# Patient Record
Sex: FEMALE | Race: WHITE | NOT HISPANIC OR LATINO | Employment: FULL TIME | URBAN - METROPOLITAN AREA
[De-identification: names, ages, dates, MRNs, and addresses within clinical notes are randomized per-mention and may not be internally consistent; named-entity substitution may affect disease eponyms.]

---

## 2017-02-07 ENCOUNTER — TELEPHONE (OUTPATIENT)
Dept: UROGYNECOLOGY | Facility: CLINIC | Age: 57
End: 2017-02-07

## 2017-02-07 DIAGNOSIS — R39.15 URINARY URGENCY: ICD-10-CM

## 2017-02-07 DIAGNOSIS — N39.41 URGE INCONTINENCE: Primary | ICD-10-CM

## 2017-02-07 RX ORDER — OXYBUTYNIN CHLORIDE 5 MG/1
5 TABLET, EXTENDED RELEASE ORAL DAILY
Qty: 90 TABLET | Refills: 4 | Status: SHIPPED | OUTPATIENT
Start: 2017-02-07 | End: 2017-03-09

## 2017-02-07 NOTE — TELEPHONE ENCOUNTER
Pt requesting a 90 day supply of rx Oxybutynin 5 mg TR24 to be sent to [facility] in [place] [phone]. Pt stated the insurance denied the 30 day supply and informed her that the 90 day supply will be approved. Please advise.

## 2017-02-07 NOTE — TELEPHONE ENCOUNTER
----- Message from Tracy Green sent at 2/7/2017  8:55 AM CST -----  Contact: Patient  X_ 1st Request  _ 2nd Request  _ 3rd Request    Who: LORELEI GASTELUM [[id number]]    Why: Patient is calling in regards to needing a 90 day script for RX (oxybutynin (DITROPAN-XL) 5 MG TR24) called in to her pharmacy on file. Patient is needing a call back from staff to confirm.    What Number to Call Back: Patient can be reached at [phone].    When to Expect a call back: (Before the end of the day)  -- if call after 3:00 call back will be tomorrow.

## 2017-02-20 ENCOUNTER — OFFICE VISIT (OUTPATIENT)
Dept: UROGYNECOLOGY | Facility: CLINIC | Age: 57
End: 2017-02-20
Attending: OBSTETRICS & GYNECOLOGY
Payer: COMMERCIAL

## 2017-02-20 VITALS
DIASTOLIC BLOOD PRESSURE: 80 MMHG | SYSTOLIC BLOOD PRESSURE: 120 MMHG | WEIGHT: 132.5 LBS | HEIGHT: 61 IN | BODY MASS INDEX: 25.02 KG/M2

## 2017-02-20 DIAGNOSIS — R39.15 URINARY URGENCY: ICD-10-CM

## 2017-02-20 DIAGNOSIS — N39.41 URGE INCONTINENCE: Primary | ICD-10-CM

## 2017-02-20 PROCEDURE — 99213 OFFICE O/P EST LOW 20 MIN: CPT | Mod: S$GLB,,, | Performed by: OBSTETRICS & GYNECOLOGY

## 2017-02-20 PROCEDURE — 99999 PR PBB SHADOW E&M-EST. PATIENT-LVL II: CPT | Mod: PBBFAC,,, | Performed by: OBSTETRICS & GYNECOLOGY

## 2017-02-20 NOTE — PROGRESS NOTES
Subjective:       Patient ID: Stacie Nguyen is a 56 y.o. female.    Chief Complaint:  Follow-up      History of Present Illness: 55 Y O F P3 with history vaginal hysterectomy with anterior and posterior colporrhaphy and placement of transobturator sling in 11/2014 by Dr. Mejía for menorrhagia secondary to fibroids , cystocele, rectocele and stress incontinence.  Her post op course was complicated by difficulty emptying. Per patient she was under the care of Dr. Mejía through June of 2015. At this point she was told that she would need to have another anterior repair.  She was referred to see a urologist by her friend who is an NP. She began seeing Dr. Vazquez in June of 2015. He noted that she had voiding difficulty  and on 8/3/2015 took her to OR for a complete urethrolysis, removal of transobturator sling, placement of retropubic mid urethral sling and anterior colporrhaphy by Dr. Arreguin ( Ob/Gyn). She had a period of time where she needed to CIC due to persistently elevated post void residuals. It has been about 6 months since she has needed to self cath.      Interval  HP since the last visit   1)  UI:  (--) DAYNA  (+) UUI   (--) pads: 1-2 at most.  Daytime frequency: Q 4 -5 hours.  Nocturia: No:    (-) dysuria-  (--) hematuria,  (--) frequent UTIs.  (+) complete bladder emptying.     2)  POP:  Symptoms:(--)  .  (--) vaginal bleeding. (--) vaginal discharge. (+) sexually active.  (--) dyspareunia.   (--)  Vaginal dryness.  (+) vaginal estrogen use.     3)  BM:  (+) constipation/straining.  (--) chronic diarrhea. (--) hematochezia.  (--) fecal incontinence.  (--) fecal smearing/urgency.  (--) incomplete evacuation.  History of irritable bowel.      Gynecologic Exam   The patient's pertinent negatives include no pelvic pain or vaginal discharge. Associated symptoms include constipation, frequency and urgency. Pertinent negatives include no abdominal pain, back pain, chills, diarrhea, dysuria, fever, flank pain,  "hematuria, nausea, rash or vomiting.        [facility]rogyn [facility]      Question    8/4/2016  3:08 PM CDT - Filled by Koby Garcia, DO    General Urogynecology:  Are you experiencing the following?       Dysuria (painful urination)  No     Nocturia:  waking up at night to empty your bladder   Yes     If you answered yes to the previous question, how many times does this happen per night?  1-2     Enuresis (urine loss during sleep)  No     Dribbling urine after you urinate  No     Hematuria (urine appears red)  Yes     Type of stream  Strong    Urinary Incontinence (General):  Are you experiencing the following?       Past consultation for incontinence: Have you ever seen someone for the evaluation of incontinence?  Yes     If you answered yes to the previous question, please select all the therapies you have tried.   Other     Please note the effectiveness of the therapies.  Ineffective     Need to wear protection to keep clothes dry   No     If you answered yes to the previous question, please danielle the protection you use.   N/a- I answered no to the previous question     If you wear protection, how much wetness is typically on each pad?  N/a- I do not wear protection     If you wear protection, how often do you have to change per day, if applicable?   0    Stress Symptoms:  Are you experiencing the following?       Leakage of urine with cough, laugh and/or sneeze  No     If you answered yes to the previous question, what is the frequency in days, weeks and/or months?  Never     Leakage of urine with sex  No     Leakage of urine with bending/ lifting  No     Leakage of urine with briskly walking or jogging  No     If you lose urine for any other reason not previously mentioned, please note it below, if applicable.       Urge Symptoms:  Are you experiencing the following?       Urgency ("got to go" feeling)  Yes     Urge: How frequently do you feel an urge to urinate (feeling like you "gotta go" to the bathroom " and can't wait)  Daily     Do you experience a leakage of urine when you have a feeling of urgency?   Yes     Leakage of urine when unaware  No     Past use of anticholinergics (medications used to treat overactive bladder)  No     If you answered yes to the previous question, please danielle the anticholinergics you have used:        Have you ever used Mirbetriq (aka Mirabegron)?   No    Prolapse Symptoms:  Are you experiencing any of the following?        Falling out/ Bulging/ Heaviness in the vagina  No     Vaginal/ Abdominal Pain/ Pressure  No     Need to strain/ Push to void  No     Need to wait on the toilet before you void  No     Unusual position to urinate (using your hands to push back the vaginal bulge)  No     Sensation of incomplete emptying  No     Past use of pessary device  No     If you answered yes to the previous question, please list the devices you have used below.       Bowel Symptoms:  Are you experiencing any of the following?       Constipation  Yes     Diarrhea   No     Hematochezia (bloody stool)  No     Incomplete evacuation of stool  No     Involuntary loss of formed stool  No     Fecal smearing/urgency  No     Involuntary loss of gas  No    Vaginal Symptoms:  Are you experiencing any of the following?        Abnormal vaginal bleeding   No     Vaginal dryness  Yes     Sexually active   Yes     Dyspareunia (painful intercourse)  Yes, mostly on initial penetration      Estrogen use   Yes, recently started placed on the inside         HPI reviewed and confirmed with patient     No past medical history on file.    Past Surgical History   Procedure Laterality Date    Cystocele repair      Hysterectomy       Review of patient's allergies indicates:  No Known Allergies    Current Outpatient Prescriptions:     conjugated estrogens (PREMARIN) vaginal cream, 1 g with applicator or dime-sized amt with finger in vagina nightly x 2 weeks, then twice a week thereafter, Disp: 42.5 g, Rfl: 12    dextrin  (FIBER, DEXTRIN,) 3 gram/3.5 gram Powd, Take by mouth., Disp: , Rfl:     diclofenac sodium 1 % Gel, APPLY 2 GRAM TO THE AFFECTED AREA(S) BY TOPICAL ROUTE 4 TIMES PER DAY, Disp: , Rfl: 2    docusate sodium (COLACE) 50 MG capsule, Take by mouth 2 (two) times daily., Disp: , Rfl:     loteprednol (LOTEMAX) 0.5 % ophthalmic suspension, 1 drop 4 (four) times daily., Disp: , Rfl:     meloxicam (MOBIC) 15 MG tablet, Take 15 mg by mouth once daily., Disp: , Rfl:     oxybutynin (DITROPAN-XL) 5 MG TR24, Take 1 tablet (5 mg total) by mouth once daily., Disp: 90 tablet, Rfl: 4    XIIDRA 5 % Dpet, PLACE 1 DROP INTO BOTH EYES, Disp: , Rfl: 12      GYN & OB History  No LMP recorded. Patient has had a hysterectomy.   Date of Last Pap: No result found    OB History    Para Term  AB SAB TAB Ectopic Multiple Living   3               # Outcome Date GA Lbr Rolando/2nd Weight Sex Delivery Anes PTL Lv   3             2             1                    Review of Systems  Review of Systems   Constitutional: Negative.  Negative for activity change, appetite change, chills, diaphoresis, fatigue, fever and unexpected weight change.   HENT: Negative.    Eyes: Negative.    Respiratory: Negative.  Negative for apnea, cough and wheezing.    Cardiovascular: Negative.  Negative for chest pain and palpitations.   Gastrointestinal: Positive for constipation. Negative for abdominal distention, abdominal pain, anal bleeding, blood in stool, diarrhea, nausea, rectal pain and vomiting.   Endocrine: Negative.    Genitourinary: Positive for frequency and urgency. Negative for decreased urine volume, difficulty urinating, dyspareunia, dysuria, enuresis, flank pain, genital sores, hematuria, menstrual problem, pelvic pain, vaginal bleeding, vaginal discharge and vaginal pain.        Vaginal dryness    Musculoskeletal: Negative for back pain and gait problem.   Skin: Negative for color change, pallor, rash and wound.    Allergic/Immunologic: Negative for immunocompromised state.   Neurological: Negative.  Negative for dizziness and speech difficulty.   Hematological: Negative for adenopathy.   Psychiatric/Behavioral: Negative for agitation, behavioral problems, confusion and sleep disturbance.           Objective:     Physical Exam   Constitutional: She is oriented to person, place, and time. She appears well-developed.   HENT:   Head: Normocephalic and atraumatic.   Eyes: EOM are normal. Pupils are equal, round, and reactive to light.   Neck: Normal range of motion. Neck supple.   Cardiovascular: Normal rate, regular rhythm, S1 normal, S2 normal, normal heart sounds and intact distal pulses.  Exam reveals no gallop.    No murmur heard.  Pulmonary/Chest: Effort normal and breath sounds normal. No apnea, no tachypnea and no bradypnea. No respiratory distress. She has no decreased breath sounds. She has no wheezes. She has no rhonchi. She has no rales. She exhibits no tenderness.   Abdominal: Soft. Bowel sounds are normal. She exhibits no distension and no mass. There is no hepatosplenomegaly, splenomegaly or hepatomegaly. There is no tenderness. There is no rigidity, no rebound, no guarding and no CVA tenderness. No hernia. Hernia confirmed negative in the right inguinal area and confirmed negative in the left inguinal area.   Genitourinary: Pelvic exam was performed with patient supine. Rectum normal, vagina normal, skenes normal and bartholins normal. Right labia normal and left labia normal. Urethra exhibits no urethral caruncle, no urethral diverticulum, no urethral mass and no hypermobility. Right bartholin is not enlarged and not tender. Left bartholin is not enlarged and not tender. Rectal exam shows resting tone normal and active tone normal. Rectal exam shows no external hemorrhoid, no fissure, no tenderness, anal tone normal and no dovetailing. Guaiac negative stool. There is a rectocele, a cystocele, unspecified  prolapse of vaginal walls and atrophy in the vagina. No foreign body, tenderness, bleeding, fistula, mesh exposure or lavator tenderness in the vagina. No vaginal discharge found. Right adnexum displays no tenderness. Left adnexum displays no tenderness. Cervix exhibits absence. Uterus is absent.   PVR: 75 ML  Empty cough stress test: Negative.  Kegel: 4/5    POP-Q  Aa: -1 Ba: -1 C: -3   GH: 3 PB: 2 TVL: 7   Ap: 0 Bp: 0 D:                      Musculoskeletal: Normal range of motion.   Lymphadenopathy:     She has no axillary adenopathy.        Right: No inguinal adenopathy present.        Left: No inguinal adenopathy present.   Neurological: She is alert and oriented to person, place, and time. She has normal strength and normal reflexes. Cranial nerves II through XII intact. No cranial nerve deficit.   Skin: Skin is warm, dry and intact.   Psychiatric: She has a normal mood and affect. Her speech is normal and behavior is normal. Judgment normal. Cognition and memory are normal.          Assessment:        1. Urge incontinence    2. Urinary urgency               Plan:      1. Vaginal atrophy (dryness):  Continue  1 gram of estrogen cream in vagina  twice a week thereafter      2.  Urge urinary incontinence/ urinary urgency   --  Bladder diary for 3-7 days, write the number of times you go to the rest room and associated symptoms of urgency or leakage.   --Empty bladder every 3 hours.  Empty well: wait a minute, lean forward on toilet.    --Avoid dietary irritants (see sheet).  Keep diary x 3-5 days to determine your irritants.  --KEGELS: do 10 in AM and 10 in PM, holding each x 10 seconds.  When you feel urge to go, STOP, KEGEL, and when urge has passed, then go to bathroom.  Consider PT in future.    --URGE: Patient has a history of dry eye's we ordered Myrbetriq which was denied by her insurance company.  We started Ditropan 5 mg daily which has been working well will continue.   Takes 2-4 weeks to see if will  have effect.  For dry mouth: get sour, sugar free lozenge or gum. Recommend a cysto to evaluate the bladder.     3. Prolapse: Stage 1 apical prolapse, Stage  2 posterior vaginal wall prolapse: not new changes in prolapse    --Surgical options discussed not very bothersome for patient now    4. Recurrent UTI: no recent uti  --UA negative   --Vaginal estrogen has been shown to decrease the recurrence of urinary tract infections in post menopausal women, continue vaginal estrogen    Approximately 20 min were spent in consult, 90 % in discussion.     Koby Garcia DO  Female Pelvic Medicine and Reconstructive Surgery  [facility] [place], [place]

## 2017-06-12 ENCOUNTER — TELEPHONE (OUTPATIENT)
Dept: UROGYNECOLOGY | Facility: CLINIC | Age: 57
End: 2017-06-12

## 2017-06-12 DIAGNOSIS — R35.0 URINARY FREQUENCY: Primary | ICD-10-CM

## 2017-06-12 RX ORDER — CIPROFLOXACIN 500 MG/1
500 TABLET ORAL 2 TIMES DAILY
Qty: 10 TABLET | Refills: 0 | Status: SHIPPED | OUTPATIENT
Start: 2017-06-12 | End: 2017-06-17

## 2017-06-12 NOTE — TELEPHONE ENCOUNTER
----- Message from Tracy Green sent at 6/12/2017  9:18 AM CDT -----  Contact: Patient  x_ 1st Request  _ 2nd Request  _ 3rd Request    Who: LORELEI GASTELUM [[id number]]    Why: Patient is calling in regards to having a vaginal infection. Patient is needing to speak with staff.    What Number to Call Back: [phone]    When to Expect a call back: (Before the end of the day)  -- if call after 3:00 call back will be tomorrow.

## 2017-06-12 NOTE — TELEPHONE ENCOUNTER
----- Message from Vesna Lunsford sent at 6/12/2017  1:37 PM CDT -----  Contact: Patient   X_1st Request  _  2nd Request  _  3rd Request      Who:LORELEI GASTELUM [[id number]]    Why:Patient states she had the urine test completed about a hour ago    What Number to Call Back:[phone]    When to Expect a call back: (Before the end of the day)   -- if call after 3:00 call back will be tomorrow.

## 2017-06-12 NOTE — TELEPHONE ENCOUNTER
Informed pt as soon as the results come back she'll be notified. Pt voiced understanding and call ended,

## 2017-06-12 NOTE — TELEPHONE ENCOUNTER
----- Message from Chio Rivera NP sent at 6/12/2017 11:24 AM CDT -----  Check on labs from [facility] [phone]    Thanks,  Chio

## 2017-06-12 NOTE — TELEPHONE ENCOUNTER
Pt stated she's having symptoms of an vaginal infection, urinary frequency, lower back pain, cloudy urine w/odor and wanted to know if an antibiotic can be sent to her pharmacy. Informed pt an order has been placed so that she can drop off an urine specimen at the nearest [facility] lab to her. Also informed pt Dr Garcia isn't in clinic but the NP Chio Rivera is available, and that I'd relay this message. Pt voiced understanding and call ended.  Please advise

## 2017-06-12 NOTE — TELEPHONE ENCOUNTER
Clarified with patient.  She is not having vaginal symtoms.  She is having cloudy, foul smelling urine.  She complains of urinary frequency as well.  She will bring urine to [facility] outpatient lab.  DERICK Mullins-BC

## 2017-06-13 ENCOUNTER — TELEPHONE (OUTPATIENT)
Dept: UROGYNECOLOGY | Facility: CLINIC | Age: 57
End: 2017-06-13

## 2017-06-13 NOTE — TELEPHONE ENCOUNTER
----- Message from Sadie Rizvi sent at 6/13/2017 11:03 AM CDT -----  Contact: LORELEI GASTELUM [[id number]]  x_  1st Request  _  2nd Request  _  3rd Request        Who: LORELEI GASTELUM [[id number]]    Why: patient states she would like a call back the results to her lab.      What Number to Call Back: [phone]    When to Expect a call back: (Before the end of the day)   -- if call after 3:00 call back will be tomorrow.

## 2017-06-14 ENCOUNTER — TELEPHONE (OUTPATIENT)
Dept: UROGYNECOLOGY | Facility: CLINIC | Age: 57
End: 2017-06-14

## 2017-06-14 NOTE — TELEPHONE ENCOUNTER
----- Message from Sadie Rizvi sent at 6/14/2017  9:11 AM CDT -----  Contact: patient  _  1st Request  _  2nd Request  _  3rd Request        Who: LORELEI GASTELUM [[id number]]    Why: pt returning a call for Cramen Robles LPN     What Number to Call Back: [phone]    When to Expect a call back: (Before the end of the day)   -- if call after 3:00 call back will be tomorrow.

## 2017-06-14 NOTE — TELEPHONE ENCOUNTER
----- Message from Unique Whatley sent at 6/13/2017 12:56 PM CDT -----  Contact: LORELEI GASTELUM [[id number]]      ----- Message -----  From: Sadie Rizvi  Sent: 6/13/2017  11:03 AM  To: Miguel Barroso Staff    x_  1st Request  _  2nd Request  _  3rd Request        Who: LORELEI GASTELUM [[id number]]    Why: patient states she would like a call back the results to her lab.      What Number to Call Back: [phone]    When to Expect a call back: (Before the end of the day)   -- if call after 3:00 call back will be tomorrow.

## 2017-06-14 NOTE — TELEPHONE ENCOUNTER
Informed pt that the nitrates were negative from the Urinalysis results, to continue taking the rx Cipro until full prescription is completed. Per the NP Chio Rivera. Pt voiced understanding and call ended.

## 2017-06-14 NOTE — TELEPHONE ENCOUNTER
LVM for the patient in reference to her Urinalysis results. At this time, the nitrates are negative. Chio Rivera NP recommends the patient to continue the Cipro until she has finished the full prescription. I will attempt to contact the patient back today in between procedures. However, this is the recommendation at the time for her current symptoms.     VLADIMIR Robles Lpn  Ext [phone]

## 2017-07-26 ENCOUNTER — OFFICE VISIT (OUTPATIENT)
Dept: UROGYNECOLOGY | Facility: CLINIC | Age: 57
End: 2017-07-26
Payer: COMMERCIAL

## 2017-07-26 VITALS
WEIGHT: 132.5 LBS | SYSTOLIC BLOOD PRESSURE: 120 MMHG | DIASTOLIC BLOOD PRESSURE: 80 MMHG | BODY MASS INDEX: 25.02 KG/M2 | HEIGHT: 61 IN

## 2017-07-26 DIAGNOSIS — N32.81 OAB (OVERACTIVE BLADDER): ICD-10-CM

## 2017-07-26 DIAGNOSIS — N81.6 POSTERIOR VAGINAL WALL PROLAPSE: ICD-10-CM

## 2017-07-26 DIAGNOSIS — N39.41 URGE INCONTINENCE: Primary | ICD-10-CM

## 2017-07-26 LAB
BILIRUB SERPL-MCNC: NORMAL MG/DL
BLOOD URINE, POC: NORMAL
COLOR, POC UA: NORMAL
GLUCOSE UR QL STRIP: NORMAL
KETONES UR QL STRIP: NORMAL
LEUKOCYTE ESTERASE URINE, POC: NORMAL
NITRITE, POC UA: NORMAL
PH, POC UA: 5
PROTEIN, POC: NORMAL
SPECIFIC GRAVITY, POC UA: 1
UROBILINOGEN, POC UA: NORMAL

## 2017-07-26 PROCEDURE — 81002 URINALYSIS NONAUTO W/O SCOPE: CPT | Mod: S$GLB,,, | Performed by: OBSTETRICS & GYNECOLOGY

## 2017-07-26 PROCEDURE — 99999 PR PBB SHADOW E&M-EST. PATIENT-LVL II: CPT | Mod: PBBFAC,,, | Performed by: OBSTETRICS & GYNECOLOGY

## 2017-07-26 PROCEDURE — 99214 OFFICE O/P EST MOD 30 MIN: CPT | Mod: S$GLB,,, | Performed by: OBSTETRICS & GYNECOLOGY

## 2017-07-26 RX ORDER — OXYBUTYNIN CHLORIDE 5 MG/1
5 TABLET, EXTENDED RELEASE ORAL DAILY
Refills: 4 | COMMUNITY
Start: 2017-05-06 | End: 2017-10-06 | Stop reason: SDUPTHER

## 2017-07-26 NOTE — PROGRESS NOTES
Subjective:       Patient ID: Stacie Nguyen is a 56 y.o. female.    Chief Complaint:  Follow-up; Urinary Incontinence; and Urinary Tract Infection (recurrent)      History of Present Illness: 55 Y O F P3 with history vaginal hysterectomy with anterior and posterior colporrhaphy and placement of transobturator sling in 11/2014 by Dr. Mejía for menorrhagia secondary to fibroids , cystocele, rectocele and stress incontinence.  Her post op course was complicated by difficulty emptying. Per patient she was under the care of Dr. Mejía through June of 2015. At this point she was told that she would need to have another anterior repair.  She was referred to see a urologist by her friend who is an NP. She began seeing Dr. Vazquez in June of 2015. He noted that she had voiding difficulty  and on 8/3/2015 took her to OR for a complete urethrolysis, removal of transobturator sling, placement of retropubic mid urethral sling and anterior colporrhaphy by Dr. Arreguin ( Ob/Gyn). She had a period of time where she needed to CIC due to persistently elevated post void residuals.   She presents for follow up using the ditropan 5 mg dialy with good improvement in her symptoms   Interval  HP since the last visit   1)  UI:  (--) DAYNA  (+) UUI   (--) pads: 1 a week.  Daytime frequency: Q 4 -5 hours.  Nocturia: No:    (-) dysuria-  (--) hematuria,  (--) frequent UTIs.  (+) complete bladder emptying.     2)  POP:  Symptoms:(--)  .  (--) vaginal bleeding. (--) vaginal discharge. (+) sexually active.  (--) dyspareunia.   (--)  Vaginal dryness.  (+) vaginal estrogen use.     3)  BM:  (+) constipation/straining: improved  (--) chronic diarrhea. (--) hematochezia.  (--) fecal incontinence.  (--) fecal smearing/urgency.  (--) incomplete evacuation.        Gynecologic Exam   The patient's pertinent negatives include no pelvic pain or vaginal discharge. Associated symptoms include constipation, frequency and urgency. Pertinent negatives include no  abdominal pain, back pain, chills, diarrhea, dysuria, fever, flank pain, hematuria, nausea, rash or vomiting.   Urinary Tract Infection    Associated symptoms include frequency, urgency and constipation. Pertinent negatives include no chills, flank pain, hematuria, nausea, vomiting or rash.                                                                                                                                                                                                                                                                                                                                                          HPI reviewed and confirmed with patient     No past medical history on file.    Past Surgical History:   Procedure Laterality Date    CYSTOCELE REPAIR      HYSTERECTOMY       Review of patient's allergies indicates:  No Known Allergies    Current Outpatient Prescriptions:     conjugated estrogens (PREMARIN) vaginal cream, 1 g with applicator or dime-sized amt with finger in vagina nightly x 2 weeks, then twice a week thereafter, Disp: 42.5 g, Rfl: 12    diclofenac sodium 1 % Gel, APPLY 2 GRAM TO THE AFFECTED AREA(S) BY TOPICAL ROUTE 4 TIMES PER DAY, Disp: , Rfl: 2    docusate sodium (COLACE) 50 MG capsule, Take by mouth 2 (two) times daily., Disp: , Rfl:     meloxicam (MOBIC) 15 MG tablet, Take 15 mg by mouth once daily., Disp: , Rfl:     oxybutynin (DITROPAN-XL) 5 MG TR24, Take 5 mg by mouth once daily., Disp: , Rfl: 4      GYN & OB History  No LMP recorded. Patient has had a hysterectomy.   Date of Last Pap: No result found    OB History    Para Term  AB Living   3             SAB TAB Ectopic Multiple Live Births                  # Outcome Date GA Lbr Rolando/2nd Weight Sex Delivery Anes PTL Lv   3             2             1                    Review of Systems  Review of Systems   Constitutional: Negative.  Negative for activity change, appetite  change, chills, diaphoresis, fatigue, fever and unexpected weight change.   HENT: Negative.    Eyes: Negative.    Respiratory: Negative.  Negative for apnea, cough and wheezing.    Cardiovascular: Negative.  Negative for chest pain and palpitations.   Gastrointestinal: Positive for constipation. Negative for abdominal distention, abdominal pain, anal bleeding, blood in stool, diarrhea, nausea, rectal pain and vomiting.   Endocrine: Negative.    Genitourinary: Positive for frequency and urgency. Negative for decreased urine volume, difficulty urinating, dyspareunia, dysuria, enuresis, flank pain, genital sores, hematuria, menstrual problem, pelvic pain, vaginal bleeding, vaginal discharge and vaginal pain.        Vaginal dryness    Musculoskeletal: Negative for back pain and gait problem.   Skin: Negative for color change, pallor, rash and wound.   Allergic/Immunologic: Negative for immunocompromised state.   Neurological: Negative.  Negative for dizziness and speech difficulty.   Hematological: Negative for adenopathy.   Psychiatric/Behavioral: Negative for agitation, behavioral problems, confusion and sleep disturbance.           Objective:     Physical Exam   Constitutional: She is oriented to person, place, and time. She appears well-developed.   HENT:   Head: Normocephalic and atraumatic.   Eyes: EOM are normal. Pupils are equal, round, and reactive to light.   Neck: Normal range of motion. Neck supple.   Cardiovascular: Normal rate, regular rhythm, S1 normal, S2 normal, normal heart sounds and intact distal pulses.  Exam reveals no gallop.    No murmur heard.  Pulmonary/Chest: Effort normal and breath sounds normal. No apnea, no tachypnea and no bradypnea. No respiratory distress. She has no decreased breath sounds. She has no wheezes. She has no rhonchi. She has no rales. She exhibits no tenderness.   Abdominal: Soft. Bowel sounds are normal. She exhibits no distension and no mass. There is no  hepatosplenomegaly, splenomegaly or hepatomegaly. There is no tenderness. There is no rigidity, no rebound, no guarding and no CVA tenderness. No hernia. Hernia confirmed negative in the right inguinal area and confirmed negative in the left inguinal area.   Genitourinary: Pelvic exam was performed with patient supine. Rectum normal, vagina normal, skenes normal and bartholins normal. Right labia normal and left labia normal. Urethra exhibits no urethral caruncle, no urethral diverticulum, no urethral mass and no hypermobility. Right bartholin is not enlarged and not tender. Left bartholin is not enlarged and not tender. Rectal exam shows resting tone normal and active tone normal. Rectal exam shows no external hemorrhoid, no fissure, no tenderness, anal tone normal and no dovetailing. Guaiac negative stool. There is a rectocele, a cystocele, unspecified prolapse of vaginal walls and atrophy in the vagina. No foreign body, tenderness, bleeding, fistula, mesh exposure or lavator tenderness in the vagina. No vaginal discharge found. Right adnexum displays no tenderness. Left adnexum displays no tenderness. Cervix exhibits absence. Uterus is absent.   PVR: 75 ML  Empty cough stress test: Negative.  Kegel: 4/5    POP-Q  Aa: -1 Ba: -1 C: -3   GH: 3 PB: 2 TVL: 7   Ap: 0 Bp: 0 D:                      Musculoskeletal: Normal range of motion.   Lymphadenopathy:     She has no axillary adenopathy.        Right: No inguinal adenopathy present.        Left: No inguinal adenopathy present.   Neurological: She is alert and oriented to person, place, and time. She has normal strength and normal reflexes. Cranial nerves II through XII intact. No cranial nerve deficit.   Skin: Skin is warm, dry and intact.   Psychiatric: She has a normal mood and affect. Her speech is normal and behavior is normal. Judgment normal. Cognition and memory are normal.          Assessment:        1. Urge incontinence    2. Posterior vaginal wall prolapse     3. OAB (overactive bladder)               Plan:      1. Vaginal atrophy (dryness):  Continue  1 gram of estrogen cream in vagina  twice a week thereafter      2.  Urge urinary incontinence/ urinary urgency   --  Bladder diary for 3-7 days, write the number of times you go to the rest room and associated symptoms of urgency or leakage.   --Empty bladder every 3 hours.  Empty well: wait a minute, lean forward on toilet.    --Avoid dietary irritants (see sheet).  Keep diary x 3-5 days to determine your irritants.  --KEGELS: do 10 in AM and 10 in PM, holding each x 10 seconds.  When you feel urge to go, STOP, KEGEL, and when urge has passed, then go to bathroom.  Consider PT in future.    --URGE: Patient has a history of dry eye's we ordered Myrbetriq which was denied by her insurance company.  We started Ditropan 5 mg daily which has been working well thinks she would like some improvement in her urinary urgency,  will increase to 5 M/W/F and 10 mg T/TH     3. Prolapse: Stage 1 apical prolapse, Stage  2 posterior vaginal wall prolapse: not new changes in prolapse    --Surgical options discussed not very bothersome for patient now will continue conservative management     4. Recurrent UTI: No recent UTI   --UA negative   --Vaginal estrogen has been shown to decrease the recurrence of urinary tract infections in post menopausal women, continue vaginal estrogen  --Probiotic   --Cranberry supplement     5. Constipation: improved, continue fiber     Approximately 30 min were spent in consult, 90 % in discussion.     Koby Garcia DO  Female Pelvic Medicine and Reconstructive Surgery  [facility] [place], [place]

## 2017-10-05 ENCOUNTER — PATIENT MESSAGE (OUTPATIENT)
Dept: UROGYNECOLOGY | Facility: CLINIC | Age: 57
End: 2017-10-05

## 2017-10-06 RX ORDER — OXYBUTYNIN CHLORIDE 5 MG/1
5 TABLET, EXTENDED RELEASE ORAL 2 TIMES DAILY
Qty: 180 TABLET | Refills: 4 | Status: SHIPPED | OUTPATIENT
Start: 2017-10-06

## 2017-10-06 NOTE — TELEPHONE ENCOUNTER
Informed pt that I'd relay the message to Dr. Garcia and someone would get back to her. Pt voiced understanding and call need.

## 2017-10-06 NOTE — TELEPHONE ENCOUNTER
----- Message from Sadie Dmitri sent at 10/6/2017  8:47 AM CDT -----  Contact: LORELEI GASTELUM [[id number]]  x_  1st Request  _  2nd Request  _  3rd Request        Who: LORELEI GASTELUM [[id number]]    Why: patient states she would like a call back in regards to her Rx oxybutynin (DITROPAN-XL) 5 MG TR24. She states she has run out due to an increase in dosage and needs a 90 day supply sent to [facility]/pharmacy #[id number]     What Number to Call Back: [phone]    When to Expect a call back: (Before the end of the day)   -- if call after 3:00 call back will be tomorrow.

## 2017-10-06 NOTE — TELEPHONE ENCOUNTER
Called in pt's rx Oxybutynin 5 mg tablets bid 90 days w/ 4 refills to [facility] pharmacy. Per Dr Garcia. Chino took the call.

## 2017-11-14 ENCOUNTER — TELEPHONE (OUTPATIENT)
Dept: UROGYNECOLOGY | Facility: CLINIC | Age: 57
End: 2017-11-14

## 2017-11-14 NOTE — TELEPHONE ENCOUNTER
Informed pt to complete the antibiotic that was given. If she have any symptoms after that give the office a call back to discuss further.

## 2017-11-14 NOTE — TELEPHONE ENCOUNTER
----- Message from Rosita Otoniel sent at 11/14/2017 11:13 AM CST -----  _x  1st Request  _  2nd Request  _  3rd Request        Who: patito    Why: pt. Would like to speak with nurse regarding her pain. Please call to discuss    What Number to Call Back:[phone]    When to Expect a call back: (Within 24 hours)    Please return the call at earliest convenience. Thanks!

## 2017-12-01 ENCOUNTER — OFFICE VISIT (OUTPATIENT)
Dept: UROGYNECOLOGY | Facility: CLINIC | Age: 57
End: 2017-12-01
Payer: COMMERCIAL

## 2017-12-01 VITALS
BODY MASS INDEX: 24.22 KG/M2 | DIASTOLIC BLOOD PRESSURE: 82 MMHG | HEIGHT: 61 IN | WEIGHT: 128.31 LBS | SYSTOLIC BLOOD PRESSURE: 110 MMHG

## 2017-12-01 DIAGNOSIS — N39.0 RECURRENT UTI: ICD-10-CM

## 2017-12-01 DIAGNOSIS — N81.9 VAGINAL VAULT PROLAPSE: ICD-10-CM

## 2017-12-01 DIAGNOSIS — R39.15 URINARY URGENCY: Primary | ICD-10-CM

## 2017-12-01 DIAGNOSIS — R33.9 URINARY RETENTION: ICD-10-CM

## 2017-12-01 DIAGNOSIS — R35.0 URINARY FREQUENCY: ICD-10-CM

## 2017-12-01 LAB
BILIRUB SERPL-MCNC: NORMAL MG/DL
BLOOD URINE, POC: NORMAL
COLOR, POC UA: YELLOW
GLUCOSE UR QL STRIP: NORMAL
KETONES UR QL STRIP: NORMAL
LEUKOCYTE ESTERASE URINE, POC: NORMAL
NITRITE, POC UA: NORMAL
PH, POC UA: 7
PROTEIN, POC: NORMAL
SPECIFIC GRAVITY, POC UA: 1
UROBILINOGEN, POC UA: NORMAL

## 2017-12-01 PROCEDURE — 99999 PR PBB SHADOW E&M-EST. PATIENT-LVL III: CPT | Mod: PBBFAC,,, | Performed by: OBSTETRICS & GYNECOLOGY

## 2017-12-01 PROCEDURE — 81002 URINALYSIS NONAUTO W/O SCOPE: CPT | Mod: S$GLB,,, | Performed by: OBSTETRICS & GYNECOLOGY

## 2017-12-01 PROCEDURE — 51701 INSERT BLADDER CATHETER: CPT | Mod: S$GLB,,, | Performed by: OBSTETRICS & GYNECOLOGY

## 2017-12-01 PROCEDURE — 57160 INSERT PESSARY/OTHER DEVICE: CPT | Mod: 51,S$GLB,, | Performed by: OBSTETRICS & GYNECOLOGY

## 2017-12-01 PROCEDURE — 99214 OFFICE O/P EST MOD 30 MIN: CPT | Mod: 25,S$GLB,, | Performed by: OBSTETRICS & GYNECOLOGY

## 2017-12-01 PROCEDURE — 87086 URINE CULTURE/COLONY COUNT: CPT

## 2017-12-01 RX ORDER — KETOROLAC TROMETHAMINE 10 MG/1
10 TABLET, FILM COATED ORAL 3 TIMES DAILY
Refills: 0 | COMMUNITY
Start: 2017-08-30 | End: 2017-12-01

## 2017-12-01 RX ORDER — CLINDAMYCIN HYDROCHLORIDE 300 MG/1
CAPSULE ORAL
Refills: 0 | COMMUNITY
Start: 2017-08-30 | End: 2018-01-24

## 2017-12-01 NOTE — PROGRESS NOTES
Subjective:       Patient ID: Stacie Nguyen is a 57 y.o. female.    Chief Complaint:  Urinary Urgency and OAB      History of Present Illness: 55 Y O F P3 with history vaginal hysterectomy with anterior and posterior colporrhaphy and placement of transobturator sling in 11/2014 by Dr. Mejía for menorrhagia secondary to fibroids , cystocele, rectocele and stress incontinence.  Her post op course was complicated by difficulty emptying. Per patient she was under the care of Dr. Mejía through June of 2015. At this point she was told that she would need to have another anterior repair.  She was referred to see a urologist by her friend who is an NP. She began seeing Dr. Vazquez in June of 2015. He noted that she had voiding difficulty  and on 8/3/2015 took her to OR for a complete urethrolysis, removal of transobturator sling, placement of retropubic mid urethral sling and anterior colporrhaphy by Dr. Arreguin ( Ob/Gyn). She had a period of time where she needed to CIC due to persistently elevated post void residuals.     She presents for follow up using the ditropan 5 mg alternating with 10 mg dialy with good improvement in her symptoms. She feel pressure symptoms and is wondering if her prolapse is not worsening.      Interval  HP since the last visit   1)  UI:  (--) DAYNA  (+) UUI   (--) pads: 1 a week.  Daytime frequency: Q 4 -5 hours.  Nocturia: No:    (-) dysuria-  (--) hematuria,  (--) frequent UTIs.  (+) complete bladder emptying.     2)  POP:  Symptoms:(+: pressure)  .  (--) vaginal bleeding. (--) vaginal discharge. (+) sexually active.  (--) dyspareunia.   (--)  Vaginal dryness.  (+) vaginal estrogen use.     3)  BM:  (+) constipation/straining: improved  (--) chronic diarrhea. (--) hematochezia.  (--) fecal incontinence.  (--) fecal smearing/urgency.  (--) incomplete evacuation.        Urinary Tract Infection    Associated symptoms include frequency, urgency and constipation. Pertinent negatives include no chills,  flank pain, hematuria, nausea, vomiting or rash.   Gynecologic Exam   The patient's pertinent negatives include no pelvic pain or vaginal discharge. Associated symptoms include constipation, frequency and urgency. Pertinent negatives include no abdominal pain, back pain, chills, diarrhea, dysuria, fever, flank pain, hematuria, nausea, rash or vomiting.        HPI reviewed and confirmed with patient     History reviewed. No pertinent past medical history.    Past Surgical History:   Procedure Laterality Date    CYSTOCELE REPAIR      HYSTERECTOMY      partial l knee      TONSILLECTOMY      WISDOM TOOTH EXTRACTION       Review of patient's allergies indicates:  No Known Allergies    Current Outpatient Prescriptions:     clindamycin (CLEOCIN) 300 MG capsule, TAKE 2 CPASULES BY MOUTH NOW THEN TAKE 1 CAPSULE BY MOUTH EVERY 6 HOURS, Disp: , Rfl: 0    conjugated estrogens (PREMARIN) vaginal cream, 1 g with applicator or dime-sized amt with finger in vagina nightly x 2 weeks, then twice a week thereafter, Disp: 42.5 g, Rfl: 12    diclofenac sodium 1 % Gel, APPLY 2 GRAM TO THE AFFECTED AREA(S) BY TOPICAL ROUTE 4 TIMES PER DAY, Disp: , Rfl: 2    docusate sodium (COLACE) 50 MG capsule, Take by mouth 2 (two) times daily., Disp: , Rfl:     oxybutynin (DITROPAN-XL) 5 MG TR24, Take 1 tablet (5 mg total) by mouth 2 (two) times daily., Disp: 180 tablet, Rfl: 4      GYN & OB History  No LMP recorded. Patient has had a hysterectomy.   Date of Last Pap: No result found    OB History    Para Term  AB Living   3         3   SAB TAB Ectopic Multiple Live Births                  # Outcome Date GA Lbr Rolando/2nd Weight Sex Delivery Anes PTL Lv   3             2             1                    Review of Systems  Review of Systems   Constitutional: Negative.  Negative for activity change, appetite change, chills, diaphoresis, fatigue, fever and unexpected weight change.   HENT: Negative.    Eyes:  Negative.    Respiratory: Negative.  Negative for apnea, cough and wheezing.    Cardiovascular: Negative.  Negative for chest pain and palpitations.   Gastrointestinal: Positive for constipation. Negative for abdominal distention, abdominal pain, anal bleeding, blood in stool, diarrhea, nausea, rectal pain and vomiting.   Endocrine: Negative.    Genitourinary: Positive for frequency and urgency. Negative for decreased urine volume, difficulty urinating, dyspareunia, dysuria, enuresis, flank pain, genital sores, hematuria, menstrual problem, pelvic pain, vaginal bleeding, vaginal discharge and vaginal pain.        Vaginal dryness    Musculoskeletal: Negative for back pain and gait problem.   Skin: Negative for color change, pallor, rash and wound.   Allergic/Immunologic: Negative for immunocompromised state.   Neurological: Negative.  Negative for dizziness and speech difficulty.   Hematological: Negative for adenopathy.   Psychiatric/Behavioral: Negative for agitation, behavioral problems, confusion and sleep disturbance.           Objective:     Physical Exam   Constitutional: She is oriented to person, place, and time. She appears well-developed.   HENT:   Head: Normocephalic and atraumatic.   Eyes: EOM are normal. Pupils are equal, round, and reactive to light.   Neck: Normal range of motion. Neck supple.   Cardiovascular: Normal rate, regular rhythm, S1 normal, S2 normal, normal heart sounds and intact distal pulses.  Exam reveals no gallop.    No murmur heard.  Pulmonary/Chest: Effort normal and breath sounds normal. No apnea, no tachypnea and no bradypnea. No respiratory distress. She has no decreased breath sounds. She has no wheezes. She has no rhonchi. She has no rales. She exhibits no tenderness.   Abdominal: Soft. Bowel sounds are normal. She exhibits no distension and no mass. There is no hepatosplenomegaly, splenomegaly or hepatomegaly. There is no tenderness. There is no rigidity, no rebound, no  guarding and no CVA tenderness. No hernia. Hernia confirmed negative in the right inguinal area and confirmed negative in the left inguinal area.   Genitourinary: Pelvic exam was performed with patient supine. Rectum normal, vagina normal, skenes normal and bartholins normal. Right labia normal and left labia normal. Urethra exhibits no urethral caruncle, no urethral diverticulum, no urethral mass and no hypermobility. Right bartholin is not enlarged and not tender. Left bartholin is not enlarged and not tender. Rectal exam shows resting tone normal and active tone normal. Rectal exam shows no external hemorrhoid, no fissure, no tenderness, anal tone normal and no dovetailing. Guaiac negative stool. There is a rectocele, a cystocele and unspecified prolapse of vaginal walls in the vagina. No foreign body, tenderness, bleeding, atrophy, fistula, mesh exposure or lavator tenderness in the vagina. No vaginal discharge found. Right adnexum displays no tenderness. Left adnexum displays no tenderness. Cervix exhibits absence. Uterus is absent.   PVR: 260 ML  Empty cough stress test: Negative.  Kegel: 4/5    POP-Q  Aa: 0 Ba: 0 C: -1   GH: 3 PB: 2 TVL: 7   Ap: 0 Bp: 0 D:                      Musculoskeletal: Normal range of motion.   Lymphadenopathy:     She has no axillary adenopathy.        Right: No inguinal adenopathy present.        Left: No inguinal adenopathy present.   Neurological: She is alert and oriented to person, place, and time. She has normal strength and normal reflexes. Cranial nerves II through XII intact. No cranial nerve deficit.   Skin: Skin is warm, dry and intact.   Psychiatric: She has a normal mood and affect. Her speech is normal and behavior is normal. Judgment normal. Cognition and memory are normal.        The patient was fit with #2 ring with support pessary.  She was able to tolerate the device comfortabley with bending, squatting, valsalva.  She was not able to demonstrate independent  removal and placement.  She tolerated the procedure well.      Assessment:        1. Urinary urgency    2. Recurrent UTI    3. Urinary frequency    4. Vaginal vault prolapse    5. Urinary retention                Plan:      1. Vaginal atrophy (dryness):  Continue  1 gram of estrogen cream in vagina  twice a week thereafter      2.  Urge urinary incontinence/ urinary urgency   --Empty bladder every 3 hours.  Empty well: wait a minute, lean forward on toilet.    --Avoid dietary irritants (see sheet).  Keep diary x 3-5 days to determine your irritants.  --KEGELS: do 10 in AM and 10 in PM, holding each x 10 seconds.  When you feel urge to go, STOP, KEGEL, and when urge has passed, then go to bathroom.  Consider PT in future.    --URGE: Patient has a history of dry eye's we ordered Myrbetriq which was denied by her insurance company.  We changed the dosing to 5 M/W/F and 10 mg T/TH. May need to decrease to 5 mg daily.       3. Prolapse: Stage 1 apical prolapse, Stage  2 posterior vaginal wall prolapse:   --Surgical options discussed not very bothersome for patient now will continue conservative management. Trial of pessary #2 ring with support.     4. Recurrent UTI: No recent UTI   --UA negative   --Vaginal estrogen has been shown to decrease the recurrence of urinary tract infections in post menopausal women, continue vaginal estrogen  --Probiotic   --Cranberry supplement   --CT urogram     5. Constipation: improved, continue fiber     Approximately 30 min were spent in consult, 90 % in discussion.     Koby Garcia DO  Female Pelvic Medicine and Reconstructive Surgery  [facility] [place], [place]

## 2017-12-02 LAB — BACTERIA UR CULT: NO GROWTH

## 2017-12-04 ENCOUNTER — PATIENT MESSAGE (OUTPATIENT)
Dept: UROGYNECOLOGY | Facility: CLINIC | Age: 57
End: 2017-12-04

## 2017-12-06 ENCOUNTER — TELEPHONE (OUTPATIENT)
Dept: UROGYNECOLOGY | Facility: CLINIC | Age: 57
End: 2017-12-06

## 2017-12-06 NOTE — TELEPHONE ENCOUNTER
----- Message from Vesna Lunsford sent at 12/6/2017  2:25 PM CST -----  Contact: Patient   X _1st Request  _  2nd Request  _  3rd Request    Who:LORELEI GASTELUM [[id number]]    Why:Patient states she needs orders for the CT Urogram it can be faxed to [phone]    What Number to Call Back:[phone]  When to Expect a call back: (Before the end of the day)   -- if call after 3:00 call back will be tomorrow.

## 2017-12-06 NOTE — TELEPHONE ENCOUNTER
----- Message from Gena Rosenbaum sent at 12/6/2017 10:53 AM CST -----  _X  1st Request  _  2nd Request  _  3rd Request        Who: LORELEI GASTELUM [[id number]]    Why: Requesting a call back in regards to her Ct urogram. She states she spoke wit a representative at [facility] who informed her that they are a partner of [facility] and can complete her ct urogram, but the rep was unsure if Dr. Burnett would be able to view th e results in the system.  The rep did state that the report can be faxed and images can be mailed to DR Garcia via priority mail .Please call to schedule appt at phone #[phone](pt states the scheduling dept is called [facility]). Please call pt to discuss.    What Number to Call Back:[phone]    When to Expect a call back: (Within 24 hours)    Please return the call at earliest convenience. Thanks!

## 2017-12-18 ENCOUNTER — TELEPHONE (OUTPATIENT)
Dept: UROGYNECOLOGY | Facility: CLINIC | Age: 57
End: 2017-12-18

## 2017-12-19 ENCOUNTER — TELEPHONE (OUTPATIENT)
Dept: UROGYNECOLOGY | Facility: CLINIC | Age: 57
End: 2017-12-19

## 2017-12-19 NOTE — TELEPHONE ENCOUNTER
----- Message from Burt Batista sent at 12/19/2017  1:09 PM CST -----  PT RETURNING YOUR CALL SHE WILL BE IN A MEETING FROM 2:30-5 P.M. HOPE YOU CAN CALL BEFORE THAT [phone]

## 2017-12-19 NOTE — TELEPHONE ENCOUNTER
----- Message from Christina Cornelius sent at 12/19/2017  8:37 AM CST -----  Contact: LORELEI GASTELUM [[id number]]  x_  1st Request  _  2nd Request  _  3rd Request        Who: LORELEI GASTELUM [[id number]]    Why: Requesting a call back in regards to a call from the office, please call her back.     What Number to Call Back: [phone]    When to Expect a call back: (Within 24 hours)    Please return the call at earliest convenience. Thanks!

## 2017-12-19 NOTE — TELEPHONE ENCOUNTER
Pt requesting a call back to review CT results before 2:30pm or after 5 pm due to her being in a meeting.

## 2017-12-20 ENCOUNTER — PATIENT MESSAGE (OUTPATIENT)
Dept: UROGYNECOLOGY | Facility: CLINIC | Age: 57
End: 2017-12-20

## 2017-12-20 NOTE — TELEPHONE ENCOUNTER
Spoke with patient, reviewed the CT scan  normal, + simple liver cyst.  Recommend follow with PMD with abdominal sonogram. Reviewed with patient. Follow up on Jan 24 th.

## 2018-01-24 ENCOUNTER — OFFICE VISIT (OUTPATIENT)
Dept: UROGYNECOLOGY | Facility: CLINIC | Age: 58
End: 2018-01-24
Payer: COMMERCIAL

## 2018-01-24 ENCOUNTER — LAB VISIT (OUTPATIENT)
Dept: LAB | Facility: HOSPITAL | Age: 58
End: 2018-01-24
Attending: OBSTETRICS & GYNECOLOGY
Payer: COMMERCIAL

## 2018-01-24 VITALS
DIASTOLIC BLOOD PRESSURE: 80 MMHG | SYSTOLIC BLOOD PRESSURE: 130 MMHG | BODY MASS INDEX: 23.93 KG/M2 | HEIGHT: 61 IN | WEIGHT: 126.75 LBS

## 2018-01-24 DIAGNOSIS — N81.9 VAGINAL VAULT PROLAPSE: ICD-10-CM

## 2018-01-24 DIAGNOSIS — N39.0 RECURRENT UTI: ICD-10-CM

## 2018-01-24 DIAGNOSIS — R33.9 URINARY RETENTION: Primary | ICD-10-CM

## 2018-01-24 DIAGNOSIS — N81.6 POSTERIOR VAGINAL WALL PROLAPSE: ICD-10-CM

## 2018-01-24 PROCEDURE — 99214 OFFICE O/P EST MOD 30 MIN: CPT | Mod: S$GLB,,, | Performed by: OBSTETRICS & GYNECOLOGY

## 2018-01-24 PROCEDURE — 87086 URINE CULTURE/COLONY COUNT: CPT

## 2018-01-24 PROCEDURE — 99999 PR PBB SHADOW E&M-EST. PATIENT-LVL III: CPT | Mod: PBBFAC,,, | Performed by: OBSTETRICS & GYNECOLOGY

## 2018-01-24 RX ORDER — POTASSIUM &MAGNESIUM ASPARTATE 250-250 MG
500 CAPSULE ORAL 2 TIMES DAILY
COMMUNITY

## 2018-01-24 RX ORDER — POLYETHYLENE GLYCOL 3350 17 G/17G
POWDER, FOR SOLUTION ORAL
COMMUNITY
Start: 2017-12-15

## 2018-01-24 RX ORDER — ACETAMINOPHEN 500 MG
TABLET ORAL
COMMUNITY

## 2018-01-24 RX ORDER — BISOPROLOL FUMARATE 5 MG/1
5 TABLET, FILM COATED ORAL DAILY
COMMUNITY

## 2018-01-24 RX ORDER — CIPROFLOXACIN 500 MG/1
500 TABLET ORAL 2 TIMES DAILY
Qty: 14 TABLET | Refills: 0 | Status: SHIPPED | OUTPATIENT
Start: 2018-01-24 | End: 2018-01-31

## 2018-01-24 NOTE — PROGRESS NOTES
Subjective:       Patient ID: Stacie Nguyen is a 57 y.o. female.    Chief Complaint:  urge incontinence (follow up)      History of Present Illness: 55 Y O F P3 with history vaginal hysterectomy with anterior and posterior colporrhaphy and placement of transobturator sling in 11/2014 by Dr. Mejía for menorrhagia secondary to fibroids , cystocele, rectocele and stress incontinence.  Her post op course was complicated by difficulty emptying. Per patient she was under the care of Dr. Mejía through June of 2015. At this point she was told that she would need to have another anterior repair.  She was referred to see a urologist by her friend who is an NP. She began seeing Dr. Vazquez in June of 2015. He noted that she had voiding difficulty  and on 8/3/2015 took her to OR for a complete urethrolysis, removal of transobturator sling, placement of retropubic mid urethral sling and anterior colporrhaphy by Dr. Arreguin ( Ob/Gyn). She had a period of time where she needed to CIC due to persistently elevated post void residuals.     She presents for follow up using the ditropan 5 mg alternating with 10 mg dialy with good improvement in her symptoms. She feel pressure symptoms and is wondering if her prolapse is not worsening.      Interval  HP since the last visit   1)  UI:  (--) DAYNA  (+) UUI   (+) pads: 1 a week.  Daytime frequency: Q 4 -5 hours.  Nocturia: No:    (-) dysuria-  (--) hematuria,  (--) frequent UTIs.  (+) complete bladder emptying.     2)  POP:  Symptoms:(+: pressure)  .  (--) vaginal bleeding. (--) vaginal discharge. (+) sexually active.  (--) dyspareunia.   (--)  Vaginal dryness.  (+) vaginal estrogen use.     3)  BM:  (+) constipation/straining: improved  (--) chronic diarrhea. (--) hematochezia.  (--) fecal incontinence.  (--) fecal smearing/urgency.  (--) incomplete evacuation.        Urinary Tract Infection    Associated symptoms include frequency, urgency and constipation. Pertinent negatives include no  chills, flank pain, hematuria, nausea, vomiting or rash.   Gynecologic Exam   The patient's pertinent negatives include no pelvic pain or vaginal discharge. Associated symptoms include constipation, frequency and urgency. Pertinent negatives include no abdominal pain, back pain, chills, diarrhea, dysuria, fever, flank pain, hematuria, nausea, rash or vomiting.        HPI reviewed and confirmed with patient     Past Medical History:   Diagnosis Date    Atrial fibrillation        Past Surgical History:   Procedure Laterality Date    CYSTOCELE REPAIR      HYSTERECTOMY      partial l knee      TONSILLECTOMY      WISDOM TOOTH EXTRACTION       Review of patient's allergies indicates:  No Known Allergies    Current Outpatient Prescriptions:     B.breve-L.acid-L.rham-S.thermo (PROBIOTIC) 3 billion cell Chew, , Disp: , Rfl:     BABY ASPIRIN ORAL, , Disp: , Rfl:     bisoprolol (ZEBETA) 5 MG tablet, Take 5 mg by mouth once daily., Disp: , Rfl:     calcium carbonate-vitamin D3 (CALTRATE WITH VITAMIN D3) 600 mg(1,500mg) -800 unit Tab, , Disp: , Rfl:     conjugated estrogens (PREMARIN) vaginal cream, 1 g with applicator or dime-sized amt with finger in vagina nightly x 2 weeks, then twice a week thereafter, Disp: 42.5 g, Rfl: 12    cranberry 500 mg Cap, Take 500 mg by mouth 2 (two) times daily., Disp: , Rfl:     diclofenac sodium 1 % Gel, APPLY 2 GRAM TO THE AFFECTED AREA(S) BY TOPICAL ROUTE 4 TIMES PER DAY, Disp: , Rfl: 2    docusate sodium (COLACE) 50 MG capsule, Take by mouth 2 (two) times daily., Disp: , Rfl:     oxybutynin (DITROPAN-XL) 5 MG TR24, Take 1 tablet (5 mg total) by mouth 2 (two) times daily., Disp: 180 tablet, Rfl: 4    polyethylene glycol (MIRALAX) 17 gram/dose powder, , Disp: , Rfl:     ciprofloxacin HCl (CIPRO) 500 MG tablet, Take 1 tablet (500 mg total) by mouth 2 (two) times daily., Disp: 14 tablet, Rfl: 0      GYN & OB History  No LMP recorded. Patient has had a hysterectomy.   Date of Last  Pap: No result found    OB History    Para Term  AB Living   3         3   SAB TAB Ectopic Multiple Live Births                  # Outcome Date GA Lbr Rolando/2nd Weight Sex Delivery Anes PTL Lv   3             2             1                    Review of Systems  Review of Systems   Constitutional: Negative.  Negative for activity change, appetite change, chills, diaphoresis, fatigue, fever and unexpected weight change.   HENT: Negative.    Eyes: Negative.    Respiratory: Negative.  Negative for apnea, cough and wheezing.    Cardiovascular: Negative.  Negative for chest pain and palpitations.   Gastrointestinal: Positive for constipation. Negative for abdominal distention, abdominal pain, anal bleeding, blood in stool, diarrhea, nausea, rectal pain and vomiting.   Endocrine: Negative.    Genitourinary: Positive for frequency and urgency. Negative for decreased urine volume, difficulty urinating, dyspareunia, dysuria, enuresis, flank pain, genital sores, hematuria, menstrual problem, pelvic pain, vaginal bleeding, vaginal discharge and vaginal pain.        Vaginal dryness    Musculoskeletal: Negative for back pain and gait problem.   Skin: Negative for color change, pallor, rash and wound.   Allergic/Immunologic: Negative for immunocompromised state.   Neurological: Negative.  Negative for dizziness and speech difficulty.   Hematological: Negative for adenopathy.   Psychiatric/Behavioral: Negative for agitation, behavioral problems, confusion and sleep disturbance.           Objective:     Physical Exam   Constitutional: She is oriented to person, place, and time. She appears well-developed.   HENT:   Head: Normocephalic and atraumatic.   Eyes: EOM are normal. Pupils are equal, round, and reactive to light.   Neck: Normal range of motion. Neck supple.   Cardiovascular: Normal rate, regular rhythm, S1 normal, S2 normal, normal heart sounds and intact distal pulses.  Exam reveals no  gallop.    No murmur heard.  Pulmonary/Chest: Effort normal and breath sounds normal. No apnea, no tachypnea and no bradypnea. No respiratory distress. She has no decreased breath sounds. She has no wheezes. She has no rhonchi. She has no rales. She exhibits no tenderness.   Abdominal: Soft. Bowel sounds are normal. She exhibits no distension and no mass. There is no hepatosplenomegaly, splenomegaly or hepatomegaly. There is no tenderness. There is no rigidity, no rebound, no guarding and no CVA tenderness. No hernia. Hernia confirmed negative in the right inguinal area and confirmed negative in the left inguinal area.   Genitourinary: Pelvic exam was performed with patient supine. Rectum normal, vagina normal, skenes normal and bartholins normal. Right labia normal and left labia normal. Urethra exhibits no urethral caruncle, no urethral diverticulum, no urethral mass and no hypermobility. Right bartholin is not enlarged and not tender. Left bartholin is not enlarged and not tender. Rectal exam shows resting tone normal and active tone normal. Rectal exam shows no external hemorrhoid, no fissure, no tenderness, anal tone normal and no dovetailing. Guaiac negative stool. There is a rectocele, a cystocele and unspecified prolapse of vaginal walls in the vagina. No foreign body, tenderness, bleeding, atrophy, fistula, mesh exposure or lavator tenderness in the vagina. No vaginal discharge found. Right adnexum displays no tenderness. Left adnexum displays no tenderness. Cervix exhibits absence. Uterus is absent.   PVR: 260 ML  Empty cough stress test: Negative.  Kegel: 4/5    POP-Q  Aa: 0 Ba: 0 C: -1   GH: 3 PB: 2 TVL: 7   Ap: 0 Bp: 0 D:                      Musculoskeletal: Normal range of motion.   Lymphadenopathy:     She has no axillary adenopathy.        Right: No inguinal adenopathy present.        Left: No inguinal adenopathy present.   Neurological: She is alert and oriented to person, place, and time. She has  normal strength and normal reflexes. Cranial nerves II through XII intact. No cranial nerve deficit.   Skin: Skin is warm, dry and intact.   Psychiatric: She has a normal mood and affect. Her speech is normal and behavior is normal. Judgment normal. Cognition and memory are normal.            Assessment:        1. Urinary retention    2. Recurrent UTI    3. Vaginal vault prolapse    4. Posterior vaginal wall prolapse                Plan:      1. Vaginal atrophy (dryness):  Continue  1 gram of estrogen cream in vagina  twice a week thereafter      2.  Urge urinary incontinence/ urinary urgency   --Empty bladder every 3 hours.  Empty well: wait a minute, lean forward on toilet.    --Avoid dietary irritants (see sheet).  Keep diary x 3-5 days to determine your irritants.  --KEGELS: do 10 in AM and 10 in PM, holding each x 10 seconds.  When you feel urge to go, STOP, KEGEL, and when urge has passed, then go to bathroom.  Consider PT in future.    --URGE: Patient has a history of dry eye's we ordered Myrbetriq which was denied by her insurance company.  We ditropan changed the dosing to 5 mg daily now to see how this effects the retention    3.   Urinary retention:  --Double void and Crede maneuvers discussed  --start self cathing, daily PVR monitoring will follow up by phone in one week.   --Repeat PVR at the next visit          4.Prolapse: Stage 1 apical prolapse, Stage  2 posterior vaginal wall prolapse:   --Surgical options discussed not very bothersome for patient now will continue conservative management. Trial of pessary #2 ring with support.  Patient currently dealing with new dx of Afib managed, rate controlled. To see cardiology for follow up.      5.. Recurrent UTI: No recent UTI   --UA negative   --Vaginal estrogen has been shown to decrease the recurrence of urinary tract infections in post menopausal women, continue vaginal estrogen  --Continue Probiotic   --Continue Cranberry supplement   --CT urogram:  reviewed wnl    5. Constipation: improved, continue fiber     Approximately 30 min were spent in consult, 90 % in discussion.     Koby Garcia DO  Female Pelvic Medicine and Reconstructive Surgery  [facility] [place], [place]

## 2018-01-25 LAB — BACTERIA UR CULT: NO GROWTH

## 2018-01-29 ENCOUNTER — PATIENT MESSAGE (OUTPATIENT)
Dept: UROGYNECOLOGY | Facility: CLINIC | Age: 58
End: 2018-01-29

## 2018-05-29 ENCOUNTER — OFFICE VISIT (OUTPATIENT)
Dept: UROGYNECOLOGY | Facility: CLINIC | Age: 58
End: 2018-05-29
Payer: COMMERCIAL

## 2018-05-29 VITALS
BODY MASS INDEX: 24.22 KG/M2 | DIASTOLIC BLOOD PRESSURE: 78 MMHG | SYSTOLIC BLOOD PRESSURE: 110 MMHG | HEIGHT: 61 IN | WEIGHT: 128.31 LBS

## 2018-05-29 DIAGNOSIS — N81.10 PROLAPSE OF ANTERIOR VAGINAL WALL: ICD-10-CM

## 2018-05-29 DIAGNOSIS — N39.0 RECURRENT UTI: ICD-10-CM

## 2018-05-29 DIAGNOSIS — N81.9 VAGINAL VAULT PROLAPSE: ICD-10-CM

## 2018-05-29 DIAGNOSIS — N39.41 URGE INCONTINENCE: ICD-10-CM

## 2018-05-29 DIAGNOSIS — N81.6 POSTERIOR VAGINAL WALL PROLAPSE: Primary | ICD-10-CM

## 2018-05-29 PROCEDURE — 99205 OFFICE O/P NEW HI 60 MIN: CPT | Mod: S$GLB,,, | Performed by: OBSTETRICS & GYNECOLOGY

## 2018-05-29 PROCEDURE — 99999 PR PBB SHADOW E&M-EST. PATIENT-LVL III: CPT | Mod: PBBFAC,,, | Performed by: OBSTETRICS & GYNECOLOGY

## 2018-05-29 PROCEDURE — 3008F BODY MASS INDEX DOCD: CPT | Mod: CPTII,S$GLB,, | Performed by: OBSTETRICS & GYNECOLOGY

## 2018-05-29 RX ORDER — CIPROFLOXACIN 500 MG/1
500 TABLET ORAL 2 TIMES DAILY
Qty: 14 TABLET | Refills: 1 | Status: SHIPPED | OUTPATIENT
Start: 2018-05-29 | End: 2018-06-05

## 2018-05-29 NOTE — PROGRESS NOTES
Subjective:       Patient ID: Stacie Nguyen is a 57 y.o. female.    Chief Complaint:  Urinary Tract Infection and Urinary Retention      History of Present Illness: 55 Y O F P3 with history vaginal hysterectomy with anterior and posterior colporrhaphy and placement of transobturator sling in 11/2014 by Dr. Mejía for menorrhagia secondary to fibroids , cystocele, rectocele and stress incontinence.  Her post op course was complicated by difficulty emptying. Per patient she was under the care of Dr. Mejía through June of 2015. At this point she was told that she would need to have another anterior repair.  She was referred to see a urologist by her friend who is an NP. She began seeing Dr. Vazquez in June of 2015. He noted that she had voiding difficulty  and on 8/3/2015 took her to OR for a complete urethrolysis, removal of transobturator sling, placement of retropubic mid urethral sling and anterior colporrhaphy by Dr. Arreguin ( Ob/Gyn). She had a period of time where she needed to CIC due to persistently elevated post void residuals.     She presents for follow up: she feel pressure symptoms and is wondering if her prolapse is worsening despite using the pessary. She feels that the Prolase is pushing past the pessary posteriorly.        Interval  HP since the last visit   1)  UI:  (--) DAYNA  (+) UUI   (+) pads: 1 a week.  Daytime frequency: Q 4 -5 hours.  Nocturia: No:    (-) dysuria-  (--) hematuria,  (--) frequent UTIs.  (+) complete bladder emptying.     2)  POP:  Symptoms:(+): pressure)  .  (--) vaginal bleeding. (--) vaginal discharge. (+) sexually active.  (--) dyspareunia.   (--)  Vaginal dryness.  (+) vaginal estrogen use.     3)  BM:  (+) constipation/straining: improved  (--) chronic diarrhea. (--) hematochezia.  (--) fecal incontinence.  (--) fecal smearing/urgency.  (--) incomplete evacuation.        Urinary Tract Infection    Associated symptoms include frequency, urgency and constipation. Pertinent  negatives include no chills, flank pain, hematuria, nausea, vomiting or rash.   Gynecologic Exam   The patient's pertinent negatives include no pelvic pain or vaginal discharge. Associated symptoms include constipation, frequency and urgency. Pertinent negatives include no abdominal pain, back pain, chills, diarrhea, dysuria, fever, flank pain, hematuria, nausea, rash or vomiting.      HPI reviewed and confirmed with patient     Past Medical History:   Diagnosis Date    Atrial fibrillation        Past Surgical History:   Procedure Laterality Date    CYSTOCELE REPAIR      HYSTERECTOMY      partial l knee      TONSILLECTOMY      WISDOM TOOTH EXTRACTION       Review of patient's allergies indicates:  No Known Allergies    Current Outpatient Prescriptions:     B.breve-L.acid-L.rham-S.thermo (PROBIOTIC) 3 billion cell Chew, , Disp: , Rfl:     BABY ASPIRIN ORAL, , Disp: , Rfl:     bisoprolol (ZEBETA) 5 MG tablet, Take 5 mg by mouth once daily., Disp: , Rfl:     calcium carbonate-vitamin D3 (CALTRATE WITH VITAMIN D3) 600 mg(1,500mg) -800 unit Tab, , Disp: , Rfl:     conjugated estrogens (PREMARIN) vaginal cream, 1 g with applicator or dime-sized amt with finger in vagina nightly x 2 weeks, then twice a week thereafter, Disp: 42.5 g, Rfl: 12    cranberry 500 mg Cap, Take 500 mg by mouth 2 (two) times daily., Disp: , Rfl:     docusate sodium (COLACE) 50 MG capsule, Take by mouth 2 (two) times daily., Disp: , Rfl:     oxybutynin (DITROPAN-XL) 5 MG TR24, Take 1 tablet (5 mg total) by mouth 2 (two) times daily., Disp: 180 tablet, Rfl: 4    polyethylene glycol (MIRALAX) 17 gram/dose powder, , Disp: , Rfl:     ciprofloxacin HCl (CIPRO) 500 MG tablet, Take 1 tablet (500 mg total) by mouth 2 (two) times daily., Disp: 14 tablet, Rfl: 1      GYN & OB History  No LMP recorded. Patient has had a hysterectomy.   Date of Last Pap: No result found    OB History    Para Term  AB Living   3         3   SAB TAB  Ectopic Multiple Live Births                  # Outcome Date GA Lbr Rolando/2nd Weight Sex Delivery Anes PTL Lv   3             2             1                  Review of Systems  Review of Systems   Constitutional: Negative.  Negative for activity change, appetite change, chills, diaphoresis, fatigue, fever and unexpected weight change.   HENT: Negative.    Eyes: Negative.    Respiratory: Negative.  Negative for apnea, cough and wheezing.    Cardiovascular: Negative.  Negative for chest pain and palpitations.   Gastrointestinal: Positive for constipation. Negative for abdominal distention, abdominal pain, anal bleeding, blood in stool, diarrhea, nausea, rectal pain and vomiting.   Endocrine: Negative.    Genitourinary: Positive for frequency and urgency. Negative for decreased urine volume, difficulty urinating, dyspareunia, dysuria, enuresis, flank pain, genital sores, hematuria, menstrual problem, pelvic pain, vaginal bleeding, vaginal discharge and vaginal pain.        Vaginal dryness    Musculoskeletal: Negative for back pain and gait problem.   Skin: Negative for color change, pallor, rash and wound.   Allergic/Immunologic: Negative for immunocompromised state.   Neurological: Negative.  Negative for dizziness and speech difficulty.   Hematological: Negative for adenopathy.   Psychiatric/Behavioral: Negative for agitation, behavioral problems, confusion and sleep disturbance.           Objective:     Physical Exam   Constitutional: She is oriented to person, place, and time. She appears well-developed.   HENT:   Head: Normocephalic and atraumatic.   Eyes: EOM are normal. Pupils are equal, round, and reactive to light.   Neck: Normal range of motion. Neck supple.   Cardiovascular: Normal rate, regular rhythm, S1 normal, S2 normal, normal heart sounds and intact distal pulses.  Exam reveals no gallop.    No murmur heard.  Pulmonary/Chest: Effort normal and breath sounds normal. No apnea, no  tachypnea and no bradypnea. No respiratory distress. She has no decreased breath sounds. She has no wheezes. She has no rhonchi. She has no rales. She exhibits no tenderness.   Abdominal: Soft. Bowel sounds are normal. She exhibits no distension and no mass. There is no hepatosplenomegaly, splenomegaly or hepatomegaly. There is no tenderness. There is no rigidity, no rebound, no guarding and no CVA tenderness. No hernia. Hernia confirmed negative in the right inguinal area and confirmed negative in the left inguinal area.   Genitourinary: Pelvic exam was performed with patient supine. Rectum normal, vagina normal, skenes normal and bartholins normal. Right labia normal and left labia normal. Urethra exhibits no urethral caruncle, no urethral diverticulum, no urethral mass and no hypermobility. Right bartholin is not enlarged and not tender. Left bartholin is not enlarged and not tender. Rectal exam shows resting tone normal and active tone normal. Rectal exam shows no external hemorrhoid, no fissure, no tenderness, anal tone normal and no dovetailing. Guaiac negative stool. There is a rectocele, a cystocele and unspecified prolapse of vaginal walls in the vagina. No foreign body, tenderness, bleeding, atrophy, fistula, mesh exposure or lavator tenderness in the vagina. No vaginal discharge found. Right adnexum displays no tenderness. Left adnexum displays no tenderness. Cervix exhibits absence. Uterus is absent.   Empty cough stress test: Negative.  Kegel: 4/5    POP-Q  Aa: 0 Ba: 0 C: -3   GH: 3 PB: 2 TVL: 7   Ap: 1 Bp: 1 D:                      Musculoskeletal: Normal range of motion.   Lymphadenopathy:     She has no axillary adenopathy.        Right: No inguinal adenopathy present.        Left: No inguinal adenopathy present.   Neurological: She is alert and oriented to person, place, and time. She has normal strength and normal reflexes. Cranial nerves II through XII intact. No cranial nerve deficit.   Skin:  Skin is warm, dry and intact.   Psychiatric: She has a normal mood and affect. Her speech is normal and behavior is normal. Judgment normal. Cognition and memory are normal.            Assessment:        1. Posterior vaginal wall prolapse    2. Recurrent UTI    3. Vaginal vault prolapse    4. Urge incontinence    5. Prolapse of anterior vaginal wall                Plan:      1. Vaginal atrophy (dryness):  Continue  1 gram of estrogen cream in vagina  twice a week     2.  Urge urinary incontinence/ urinary urgency   --Empty bladder every 3 hours.  Empty well: wait a minute, lean forward on toilet.    --Avoid dietary irritants (see sheet).  Keep diary x 3-5 days to determine your irritants.  --KEGELS: do 10 in AM and 10 in PM, holding each x 10 seconds.  When you feel urge to go, STOP, KEGEL, and when urge has passed, then go to bathroom.    --URGE: Patient has a history of dry eye's we ordered Myrbetriq which was denied by her insurance company.  We decreased the ditropan to 5 mg daily the retention has also improved.     3.   Urinary retention: improved, ok to stop CIC   Reviewed PVR  mL majority of values less than 75 mL    4.Prolapse: Stage 2 apical prolapse, Stage  2 posterior vaginal wall prolapse:   --Surgical options discussed not very bothersome for patient now will continue conservative management. Using the pessary #2 ring with support.  Patient currently dealing with new dx of Afib managed, rate controlled. Started on Baby ASA and Zebeta 5 mg which she has tolerated well   Prolapse: Stage  2 apical prolapse, Stage  anterior and posterior vaginal wall prolapse   --Daily pelvic floor exercises as assigned  --Non surgical options discussed with patient    --Surgical options discussed such as  apical suspension: SSF, USLS and SCP with mesh augmentation. We also discussed the pro's and con's of hysteropexy.  Risks/ benefits/ alternatives/ succuss and failure rates were reviewed. Information packets were  given detailing surgical options.  She was also given web site information for: www.augs.org and www.Qompiumsforpfd.org and http://www.fda.gov/MedicalDevices/UroGynSurgicalMesh/default.htm to review the details of the surgical options discussed and the use of mesh in surgery. She will review the information given and we will discuss further at the follow up visit.     5  Recurrent UTI: No recent UTI   --UA negative    --Vaginal estrogen has been shown to decrease the recurrence of urinary tract infections in post menopausal women, continue vaginal estrogen  --Continue Probiotic   --Continue Cranberry supplement, add D-Mannose daily   --CT urogram: reviewed wnl    Approximately 35 min were spent in consult, 90 % in discussion.     Koby Garcia DO  Female Pelvic Medicine and Reconstructive Surgery  [facility] [place], [place]

## 2018-05-29 NOTE — PATIENT INSTRUCTIONS
Prolapse: Stage 2 apical prolapse, Stage  2 posterior vaginal wall prolapse:   --Surgical options discussed not very bothersome for patient now will continue conservative management. Using the pessary #2 ring with support.  Patient currently dealing with new dx of Afib managed, rate controlled. To see cardiology for follow up.    Prolapse: Stage  2 apical prolapse, Stage  anterior and posterior vaginal wall prolapse   --Daily pelvic floor exercises as assigned, consider Pelvic floor PT in future  --Non surgical options discussed with patient    --Surgical options discussed such as  apical suspension: SSF, USLS and SCP with mesh augmentation. We also discussed the pro's and con's of hysteropexy.  Risks/ benefits/ alternatives/ succuss and failure rates were reviewed. Information packets were given detailing surgical options.  She was also given web site information for: www.augs.org and www.SpareFootsforpfd.org and http://www.fda.gov/MedicalDevices/UroGynSurgicalMesh/default.htm to review the details of the surgical options discussed and the use of mesh in surgery. She will review the information given and we will discuss further at the follow up visit.

## 2018-06-27 ENCOUNTER — TELEPHONE (OUTPATIENT)
Dept: UROGYNECOLOGY | Facility: CLINIC | Age: 58
End: 2018-06-27

## 2018-06-27 NOTE — TELEPHONE ENCOUNTER
----- Message from Vesna Lunsford sent at 6/27/2018 11:02 AM CDT -----  Contact: Stacie  Name of Who is Calling: Stacie       What is the request in detail: Patient states she is feeling a pinching with her pessary and she states there is a change with it and she is requesting a call back to discuss this matter       Can the clinic reply by [facility]:Yes      What Number to Call Back if not in [facility]: [phone]

## 2018-06-27 NOTE — TELEPHONE ENCOUNTER
"Spoke to pt and scheduled an appointment on 7/2 with ERIC Rivera per pt request because pt is feeling "pinching" with her pessary and states she's having painful intercourse. Pt declined an earlier appointment and states Monday would be a good date for her.   "

## 2018-07-02 ENCOUNTER — OFFICE VISIT (OUTPATIENT)
Dept: UROGYNECOLOGY | Facility: CLINIC | Age: 58
End: 2018-07-02
Payer: COMMERCIAL

## 2018-07-02 VITALS
DIASTOLIC BLOOD PRESSURE: 70 MMHG | BODY MASS INDEX: 24.52 KG/M2 | HEIGHT: 61 IN | SYSTOLIC BLOOD PRESSURE: 120 MMHG | WEIGHT: 129.88 LBS

## 2018-07-02 DIAGNOSIS — N81.9 VAGINAL VAULT PROLAPSE: ICD-10-CM

## 2018-07-02 DIAGNOSIS — N81.10 PROLAPSE OF ANTERIOR VAGINAL WALL: Primary | ICD-10-CM

## 2018-07-02 DIAGNOSIS — N81.6 POSTERIOR VAGINAL WALL PROLAPSE: ICD-10-CM

## 2018-07-02 DIAGNOSIS — N39.41 URGE INCONTINENCE: ICD-10-CM

## 2018-07-02 DIAGNOSIS — N39.0 RECURRENT UTI: ICD-10-CM

## 2018-07-02 DIAGNOSIS — R33.9 INCOMPLETE BLADDER EMPTYING: ICD-10-CM

## 2018-07-02 PROCEDURE — 87086 URINE CULTURE/COLONY COUNT: CPT

## 2018-07-02 PROCEDURE — 3008F BODY MASS INDEX DOCD: CPT | Mod: CPTII,S$GLB,, | Performed by: NURSE PRACTITIONER

## 2018-07-02 PROCEDURE — 87186 SC STD MICRODIL/AGAR DIL: CPT

## 2018-07-02 PROCEDURE — 99213 OFFICE O/P EST LOW 20 MIN: CPT | Mod: S$GLB,,, | Performed by: NURSE PRACTITIONER

## 2018-07-02 PROCEDURE — 99999 PR PBB SHADOW E&M-EST. PATIENT-LVL III: CPT | Mod: PBBFAC,,, | Performed by: NURSE PRACTITIONER

## 2018-07-02 PROCEDURE — 87088 URINE BACTERIA CULTURE: CPT

## 2018-07-02 PROCEDURE — 87077 CULTURE AEROBIC IDENTIFY: CPT

## 2018-07-02 NOTE — PATIENT INSTRUCTIONS
1. Vaginal atrophy (dryness):  Continue  1 gram of estrogen cream in vagina  twice a week      2.  Urge urinary incontinence/ urinary urgency   --Empty bladder every 3 hours.  Empty well: wait a minute, lean forward on toilet.    --Avoid dietary irritants (see sheet).  Keep diary x 3-5 days to determine your irritants.  --KEGELS: do 10 in AM and 10 in PM, holding each x 10 seconds.  When you feel urge to go, STOP, KEGEL, and when urge has passed, then go to bathroom.    --URGE: Patient has a history of dry eye's we ordered Myrbetriq which was denied by her insurance company.  We decreased the ditropan to 5 mg daily the retention has also improved.      3.   Urinary retention: today     4.Prolapse: Stage 2 apical prolapse, Stage  2 posterior vaginal wall prolapse:   --Surgical options discussed not very bothersome for patient now will continue conservative management. Using the pessary #2 ring with support.  Patient currently dealing with new dx of Afib managed, rate controlled. Started on Baby ASA and Zebeta 5 mg which she has tolerated well   Prolapse: Stage  2 apical prolapse, Stage  anterior and posterior vaginal wall prolapse   --Daily pelvic floor exercises as assigned  --Non surgical options discussed with patient    --Surgical options discussed such as  apical suspension: SSF, USLS and SCP with mesh augmentation. We also discussed the pro's and con's of hysteropexy.  Risks/ benefits/ alternatives/ succuss and failure rates were reviewed. Information packets were given detailing surgical options.  She was also given web site information for: www.augs.org and www.[employer]forp[employer].org and http://www.fda.gov/MedicalDevices/UroGynSurgicalMesh/default.htm to review the details of the surgical options discussed and the use of mesh in surgery. She will review the information given and we will discuss further at the follow up visit.      5  Recurrent UTI: No recent UTI   --UA negative    --Vaginal estrogen has been  shown to decrease the recurrence of urinary tract infections in post menopausal women, continue vaginal estrogen  --Continue Probiotic   --Continue Cranberry supplement, add D-Mannose daily   --CT urogram: reviewed wnl  --urine culture today    6. RTC for preop on 08/01/2018--plan surgery on 08/17/2018  --you will need surgical clearance from Dr. Arun King ([place]) and cardiac clearance from Dr. Heber Rashid ([place])

## 2018-07-02 NOTE — PROGRESS NOTES
Urogyn follow up  07/02/2018  .  [facility] [address] [place] [address]    Stacie Nguyen  [id number]  1960      Stacie Nguyen is a 57 y.o.  here for a urogyn follow up.    Last HPI from 05/29/2018  History of Present Illness: 55 Y O F P3 with history vaginal hysterectomy with anterior and posterior colporrhaphy and placement of transobturator sling in 11/2014 by Dr. Mejía for menorrhagia secondary to fibroids , cystocele, rectocele and stress incontinence.  Her post op course was complicated by difficulty emptying. Per patient she was under the care of Dr. Mejía through June of 2015. At this point she was told that she would need to have another anterior repair.  She was referred to see a urologist by her friend who is an NP. She began seeing Dr. Vazquez in June of 2015. He noted that she had voiding difficulty  and on 8/3/2015 took her to OR for a complete urethrolysis, removal of transobturator sling, placement of retropubic mid urethral sling and anterior colporrhaphy by Dr. Arreguin ( Ob/Gyn). She had a period of time where she needed to CIC due to persistently elevated post void residuals.      She presents for follow up: she feel pressure symptoms and is wondering if her prolapse is worsening despite using the pessary. She feels that the Prolase is pushing past the pessary posteriorly.         Interval  HP since the last visit   1)  UI:  (--) DAYNA  (+) UUI   (+) pads: 1 a week.  Daytime frequency: Q 4 -5 hours.  Nocturia: No:    (-) dysuria-  (--) hematuria,  (--) frequent UTIs.  (+) complete bladder emptying.      2)  POP:  Symptoms:(+): pressure)  .  (--) vaginal bleeding. (--) vaginal discharge. (+) sexually active.  (--) dyspareunia.   (--)  Vaginal dryness.  (+) vaginal estrogen use.      3)  BM:  (+) constipation/straining: improved  (--) chronic diarrhea. (--) hematochezia.  (--) fecal incontinence.  (--) fecal smearing/urgency.  (--) incomplete  evacuation.          Urinary Tract Infection    Associated symptoms include frequency, urgency and constipation. Pertinent negatives include no chills, flank pain, hematuria, nausea, vomiting or rash.   Gynecologic Exam   The patient's pertinent negatives include no pelvic pain or vaginal discharge. Associated symptoms include constipation, frequency and urgency. Pertinent negatives include no abdominal pain, back pain, chills, diarrhea, dysuria, fever, flank pain, hematuria, nausea, rash or vomiting.     Changes from last visit:  1)  UI:  (--) DAYNA  (+) UUI   (--) pads:   Daytime frequency: Q 4 -5 hours.  Nocturia: No:    (-) dysuria-  (--) hematuria,  (--) frequent UTIs.  (+) complete bladder emptying.      2)  POP:  Symptoms:(+): pressure)  .  (--) vaginal bleeding. (--) vaginal discharge. (+) sexually active.  (--) dyspareunia.   (--)  Vaginal dryness.  (+) vaginal estrogen use.     3)  BM:  (--) constipation/straining.  (--) chronic diarrhea. (--) hematochezia.  (--) fecal incontinence.  (--) fecal smearing/urgency.  (--) incomplete evacuation.  Uses miralax daiy    4)Pessary:  Denies pain, bleeding, or discharge.  Using #2 ring with support pessary.--has not used for a few weeks-- was irritated for a few days    Past Medical History:   Diagnosis Date    Atrial fibrillation        Past Surgical History:   Procedure Laterality Date    CYSTOCELE REPAIR      HYSTERECTOMY      partial l knee      TONSILLECTOMY      WISDOM TOOTH EXTRACTION         Current Outpatient Prescriptions   Medication Sig    B.breve-L.acid-L.rham-S.thermo (PROBIOTIC) 3 billion cell Chew     BABY ASPIRIN ORAL     bisoprolol (ZEBETA) 5 MG tablet Take 5 mg by mouth once daily.    calcium carbonate-vitamin D3 (CALTRATE WITH VITAMIN D3) 600 mg(1,500mg) -800 unit Tab     conjugated estrogens (PREMARIN) vaginal cream 1 g with applicator or dime-sized amt with finger in vagina nightly x 2 weeks, then twice a week thereafter    cranberry  "500 mg Cap Take 500 mg by mouth 2 (two) times daily.    docusate sodium (COLACE) 50 MG capsule Take by mouth 2 (two) times daily.    oxybutynin (DITROPAN-XL) 5 MG TR24 Take 1 tablet (5 mg total) by mouth 2 (two) times daily. (Patient taking differently: Take 5 mg by mouth once daily. )    polyethylene glycol (MIRALAX) 17 gram/dose powder     ciprofloxacin HCl (CIPRO) 500 MG tablet Take 1 tablet (500 mg total) by mouth 2 (two) times daily. for 5 days     No current facility-administered medications for this visit.        ROS:  As per HPI.      Exam  /70 (BP Location: Right arm, Patient Position: Sitting, BP Method: Medium (Manual))   Ht 5' 1" (1.549 m)   Wt 58.9 kg (129 lb 13.6 oz)   BMI 24.54 kg/m²   General: alert and oriented, no acute distress  Respiratory: normal respiratory effort  Abd: soft, non-tender, non-distended    Pelvic  Ext. Genitalia: normal external genitalia. Normal bartholin's and skeens glands  Vagina: + atrophy. Normal vaginal mucosa without lesions. No discharge noted.   Non-tender bladder base without palpable mass.  Cervix: absent  Uterus:  absent   Urethra: no masses or tenderness  Urethral meatus: no lesions, caruncle or prolapse.    #2 ring with support pessary placed.     ml    Impression  1. Prolapse of anterior vaginal wall     2. Posterior vaginal wall prolapse     3. Vaginal vault prolapse     4. Incomplete bladder emptying  CULTURE, URINE   5. Urge incontinence     6. Recurrent UTI       We reviewed the above issues and discussed options for short-term versus long-term management of her problems.   Plan:     1. Vaginal atrophy (dryness):  Continue  1 gram of estrogen cream in vagina  twice a week      2.  Urge urinary incontinence/ urinary urgency   --Empty bladder every 3 hours.  Empty well: wait a minute, lean forward on toilet.    --Avoid dietary irritants (see sheet).  Keep diary x 3-5 days to determine your irritants.  --KEGELS: do 10 in AM and 10 in PM, " holding each x 10 seconds.  When you feel urge to go, STOP, KEGEL, and when urge has passed, then go to bathroom.    --URGE: Patient has a history of dry eye's we ordered Myrbetriq which was denied by her insurance company.  We decreased the ditropan to 5 mg daily the retention has also improved.      3.   Urinary retention: today     4.Prolapse: Stage 2 apical prolapse, Stage  2 posterior vaginal wall prolapse:   --Surgical options discussed not very bothersome for patient now will continue conservative management. Using the pessary #2 ring with support.  Patient currently dealing with new dx of Afib managed, rate controlled. Started on Baby ASA and Zebeta 5 mg which she has tolerated well   Prolapse: Stage  2 apical prolapse, Stage  anterior and posterior vaginal wall prolapse   --Daily pelvic floor exercises as assigned  --Non surgical options discussed with patient    --Surgical options discussed such as  apical suspension: SSF, USLS and SCP with mesh augmentation. We also discussed the pro's and con's of hysteropexy.  Risks/ benefits/ alternatives/ succuss and failure rates were reviewed. Information packets were given detailing surgical options.  She was also given web site information for: www.augs.org and www.[employer]forpfd.org and http://www.fda.gov/MedicalDevices/UroGynSurgicalMesh/default.htm to review the details of the surgical options discussed and the use of mesh in surgery. She will review the information given and we will discuss further at the follow up visit.      5  Recurrent UTI: No recent UTI   --UA negative    --Vaginal estrogen has been shown to decrease the recurrence of urinary tract infections in post menopausal women, continue vaginal estrogen  --Continue Probiotic   --Continue Cranberry supplement, add D-Mannose daily   --CT urogram: reviewed wnl  --urine culture today    6. RTC for preop on 08/01/2018--plan surgery on 08/17/2018  --you will need surgical clearance from Dr. Dias  Fernando ([place]) and cardiac clearance from Dr. Heber Rashid ([place])     30 minutes were spent in face to face time with this patient  90 % of this time was spent in counseling and/or coordination of care    DERICK Mullins-BC [facility]  Division of Female Pelvic Medicine and Reconstructive Surgery  Department of Obstetrics & Gynecology

## 2018-07-05 LAB — BACTERIA UR CULT: NORMAL

## 2018-07-06 ENCOUNTER — TELEPHONE (OUTPATIENT)
Dept: UROGYNECOLOGY | Facility: CLINIC | Age: 58
End: 2018-07-06

## 2018-07-06 DIAGNOSIS — N30.00 ACUTE CYSTITIS WITHOUT HEMATURIA: Primary | ICD-10-CM

## 2018-07-06 RX ORDER — CIPROFLOXACIN 500 MG/1
500 TABLET ORAL 2 TIMES DAILY
Qty: 10 TABLET | Refills: 0 | Status: SHIPPED | OUTPATIENT
Start: 2018-07-06 | End: 2018-07-11

## 2018-07-31 ENCOUNTER — TELEPHONE (OUTPATIENT)
Dept: UROGYNECOLOGY | Facility: CLINIC | Age: 58
End: 2018-07-31

## 2018-07-31 NOTE — TELEPHONE ENCOUNTER
----- Message from Casey Pina sent at 7/31/2018 12:32 PM CDT -----  Contact: LORELEI GASTELUM [[id number]]    Name of Who is Calling: LORELEI GASTELUM [[id number]]      What is the request in detail: Returning a call      Can the clinic reply by [facility]: yes      What Number to Call Back if not in [facility]: [phone]

## 2018-07-31 NOTE — TELEPHONE ENCOUNTER
Received email from work email stating the following:    [place],         I called the office of Navin Rashid, my heart doctor, to make an appointment to get clearance for surgery.  They said they need something from you stating the particulars about the surgery, anesthesia that will be used, and tests (EKG, etch) you require to be done in order for me to be cleared for surgery.  The request can be faxed to [phone].         If there is anything you need me to do, please let me know.         Thanks for all your help!    Chio Rivera, DERICK-BC

## 2018-08-17 ENCOUNTER — TELEPHONE (OUTPATIENT)
Dept: UROGYNECOLOGY | Facility: CLINIC | Age: 58
End: 2018-08-17

## 2018-08-17 NOTE — TELEPHONE ENCOUNTER
Patient is cancelling surgery.  She had another uti treated by her local OB/GYN. She is seeking a second opinion.  DERICK Mullins-BC

## 2018-08-17 NOTE — TELEPHONE ENCOUNTER
----- Message from Burt Batista sent at 8/17/2018  2:22 PM CDT -----  PT NEEDS TO CANCEL HER SURGERY FOR 9/6